# Patient Record
Sex: MALE | Race: BLACK OR AFRICAN AMERICAN | NOT HISPANIC OR LATINO | Employment: FULL TIME | URBAN - METROPOLITAN AREA
[De-identification: names, ages, dates, MRNs, and addresses within clinical notes are randomized per-mention and may not be internally consistent; named-entity substitution may affect disease eponyms.]

---
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
## 2022-09-27 ENCOUNTER — HOSPITAL ENCOUNTER (EMERGENCY)
Facility: HOSPITAL | Age: 22
Discharge: HOME | End: 2022-09-27
Attending: EMERGENCY MEDICINE

## 2022-09-27 VITALS
HEART RATE: 83 BPM | WEIGHT: 164.44 LBS | TEMPERATURE: 98 F | RESPIRATION RATE: 19 BRPM | BODY MASS INDEX: 25.01 KG/M2 | SYSTOLIC BLOOD PRESSURE: 141 MMHG | DIASTOLIC BLOOD PRESSURE: 80 MMHG | OXYGEN SATURATION: 98 %

## 2022-09-27 DIAGNOSIS — K02.9 DENTAL CARIES: Primary | ICD-10-CM

## 2022-09-27 PROCEDURE — 99284 EMERGENCY DEPT VISIT MOD MDM: CPT

## 2022-09-27 RX ORDER — AMOXICILLIN 500 MG/1
500 CAPSULE ORAL 3 TIMES DAILY
Qty: 21 CAPSULE | Refills: 0 | Status: SHIPPED | OUTPATIENT
Start: 2022-09-27 | End: 2022-10-04

## 2022-09-27 RX ORDER — KETOROLAC TROMETHAMINE 10 MG/1
10 TABLET, FILM COATED ORAL EVERY 6 HOURS PRN
Qty: 15 TABLET | Refills: 0 | Status: SHIPPED | OUTPATIENT
Start: 2022-09-27

## 2022-09-27 NOTE — ED PROVIDER NOTES
Encounter Date: 9/27/2022       History     Chief Complaint   Patient presents with    Dental Pain     Since last night, states the pain radiated to his R side head. Denies seeing a dentist.      22-year-old male with complaint of left lower toothache for the past couple days.  Patient reports constant aching pain that is worse with eating and drinking.      Review of patient's allergies indicates:  No Known Allergies  History reviewed. No pertinent past medical history.  History reviewed. No pertinent surgical history.  History reviewed. No pertinent family history.     Review of Systems   Constitutional:  Negative for fever.   HENT:  Positive for dental problem. Negative for sore throat.    Respiratory:  Negative for shortness of breath.    Cardiovascular:  Negative for chest pain.   Gastrointestinal:  Negative for nausea.   Genitourinary:  Negative for dysuria.   Musculoskeletal:  Negative for back pain.   Skin:  Negative for rash.   Neurological:  Negative for weakness.   Hematological:  Does not bruise/bleed easily.     Physical Exam     Initial Vitals [09/27/22 1025]   BP Pulse Resp Temp SpO2   (!) 141/80 83 19 98.4 °F (36.9 °C) 98 %      MAP       --         Physical Exam    Nursing note and vitals reviewed.  Constitutional: He appears well-developed and well-nourished.   HENT:   Head: Normocephalic and atraumatic.   Left bottom molar #2 tenderness and decay, no facial swelling   Eyes: Conjunctivae are normal. Pupils are equal, round, and reactive to light.   Neck: Neck supple.   Normal range of motion.  Cardiovascular:  Normal rate, regular rhythm, normal heart sounds and intact distal pulses.           Pulmonary/Chest: Breath sounds normal.   Abdominal: Abdomen is soft. There is no rebound and no guarding.   Musculoskeletal:         General: Normal range of motion.      Cervical back: Normal range of motion and neck supple.     Neurological: He is alert.   Skin: Skin is warm and dry.   Psychiatric: He has a  normal mood and affect. His behavior is normal. Thought content normal.       ED Course   Procedures  Labs Reviewed - No data to display       Imaging Results    None          Medications - No data to display                           Clinical Impression:   Final diagnoses:  [K02.9] Dental caries (Primary)        ED Disposition Condition    Discharge Stable          ED Prescriptions       Medication Sig Dispense Start Date End Date Auth. Provider    amoxicillin (AMOXIL) 500 MG capsule Take 1 capsule (500 mg total) by mouth 3 (three) times daily. for 7 days 21 capsule 9/27/2022 10/4/2022 Ruben Paiz NP    ketorolac (TORADOL) 10 mg tablet Take 1 tablet (10 mg total) by mouth every 6 (six) hours as needed for Pain. 15 tablet 9/27/2022 -- Ruben Paiz NP          Follow-up Information       Follow up With Specialties Details Why Contact Info    dentist of choice  Schedule an appointment as soon as possible for a visit  As needed              Ruben Paiz NP  09/27/22 1004

## 2022-12-26 ENCOUNTER — HOSPITAL ENCOUNTER (EMERGENCY)
Facility: HOSPITAL | Age: 22
Discharge: HOME | End: 2022-12-26
Attending: EMERGENCY MEDICINE
Payer: MEDICAID

## 2022-12-26 VITALS
TEMPERATURE: 99 F | SYSTOLIC BLOOD PRESSURE: 108 MMHG | HEART RATE: 89 BPM | RESPIRATION RATE: 16 BRPM | OXYGEN SATURATION: 98 % | DIASTOLIC BLOOD PRESSURE: 58 MMHG

## 2022-12-26 DIAGNOSIS — F12.10 MARIJUANA ABUSE: ICD-10-CM

## 2022-12-26 DIAGNOSIS — R11.10 VOMITING, UNSPECIFIED VOMITING TYPE, UNSPECIFIED WHETHER NAUSEA PRESENT: Primary | ICD-10-CM

## 2022-12-26 DIAGNOSIS — E86.9 VOLUME DEPLETION: ICD-10-CM

## 2022-12-26 LAB
ALBUMIN SERPL BCP-MCNC: 4 G/DL (ref 3.5–5.2)
ALP SERPL-CCNC: 54 U/L (ref 55–135)
ALT SERPL W/O P-5'-P-CCNC: 11 U/L (ref 10–44)
AMPHET+METHAMPHET UR QL: NEGATIVE
ANION GAP SERPL CALC-SCNC: 12 MMOL/L (ref 8–16)
AST SERPL-CCNC: 19 U/L (ref 10–40)
BARBITURATES UR QL SCN>200 NG/ML: NEGATIVE
BASOPHILS # BLD AUTO: 0.01 K/UL (ref 0–0.2)
BASOPHILS NFR BLD: 0.1 % (ref 0–1.9)
BENZODIAZ UR QL SCN>200 NG/ML: NEGATIVE
BILIRUB SERPL-MCNC: 0.9 MG/DL (ref 0.1–1)
BUN SERPL-MCNC: 10 MG/DL (ref 6–20)
BZE UR QL SCN: NEGATIVE
CALCIUM SERPL-MCNC: 9.9 MG/DL (ref 8.7–10.5)
CANNABINOIDS UR QL SCN: ABNORMAL
CHLORIDE SERPL-SCNC: 100 MMOL/L (ref 95–110)
CO2 SERPL-SCNC: 25 MMOL/L (ref 23–29)
CREAT SERPL-MCNC: 1.1 MG/DL (ref 0.5–1.4)
CREAT UR-MCNC: 395.7 MG/DL (ref 23–375)
DIFFERENTIAL METHOD: ABNORMAL
EOSINOPHIL # BLD AUTO: 0 K/UL (ref 0–0.5)
EOSINOPHIL NFR BLD: 0.1 % (ref 0–8)
ERYTHROCYTE [DISTWIDTH] IN BLOOD BY AUTOMATED COUNT: 11.9 % (ref 11.5–14.5)
EST. GFR  (NO RACE VARIABLE): >60 ML/MIN/1.73 M^2
GLUCOSE SERPL-MCNC: 142 MG/DL (ref 70–110)
HCT VFR BLD AUTO: 38.8 % (ref 40–54)
HGB BLD-MCNC: 12.8 G/DL (ref 14–18)
IMM GRANULOCYTES # BLD AUTO: 0.02 K/UL (ref 0–0.04)
IMM GRANULOCYTES NFR BLD AUTO: 0.3 % (ref 0–0.5)
INFLUENZA A, MOLECULAR: NEGATIVE
INFLUENZA B, MOLECULAR: NEGATIVE
LIPASE SERPL-CCNC: 8 U/L (ref 4–60)
LYMPHOCYTES # BLD AUTO: 0.3 K/UL (ref 1–4.8)
LYMPHOCYTES NFR BLD: 4.1 % (ref 18–48)
MCH RBC QN AUTO: 26.7 PG (ref 27–31)
MCHC RBC AUTO-ENTMCNC: 33 G/DL (ref 32–36)
MCV RBC AUTO: 81 FL (ref 82–98)
METHADONE UR QL SCN>300 NG/ML: NEGATIVE
MONOCYTES # BLD AUTO: 0.2 K/UL (ref 0.3–1)
MONOCYTES NFR BLD: 3.2 % (ref 4–15)
NEUTROPHILS # BLD AUTO: 6.7 K/UL (ref 1.8–7.7)
NEUTROPHILS NFR BLD: 92.2 % (ref 38–73)
NRBC BLD-RTO: 0 /100 WBC
OPIATES UR QL SCN: NEGATIVE
PCP UR QL SCN>25 NG/ML: NEGATIVE
PLATELET # BLD AUTO: 184 K/UL (ref 150–450)
PMV BLD AUTO: 10.6 FL (ref 9.2–12.9)
POTASSIUM SERPL-SCNC: 4.3 MMOL/L (ref 3.5–5.1)
PROT SERPL-MCNC: 8.7 G/DL (ref 6–8.4)
RBC # BLD AUTO: 4.8 M/UL (ref 4.6–6.2)
SARS-COV-2 RDRP RESP QL NAA+PROBE: NEGATIVE
SODIUM SERPL-SCNC: 137 MMOL/L (ref 136–145)
SPECIMEN SOURCE: NORMAL
TOXICOLOGY INFORMATION: ABNORMAL
WBC # BLD AUTO: 7.29 K/UL (ref 3.9–12.7)

## 2022-12-26 PROCEDURE — 80053 COMPREHEN METABOLIC PANEL: CPT | Performed by: NURSE PRACTITIONER

## 2022-12-26 PROCEDURE — 96375 TX/PRO/DX INJ NEW DRUG ADDON: CPT

## 2022-12-26 PROCEDURE — 25000003 PHARM REV CODE 250: Performed by: NURSE PRACTITIONER

## 2022-12-26 PROCEDURE — 99285 EMERGENCY DEPT VISIT HI MDM: CPT | Mod: 25

## 2022-12-26 PROCEDURE — 83690 ASSAY OF LIPASE: CPT | Performed by: NURSE PRACTITIONER

## 2022-12-26 PROCEDURE — 80307 DRUG TEST PRSMV CHEM ANLYZR: CPT | Performed by: EMERGENCY MEDICINE

## 2022-12-26 PROCEDURE — 63600175 PHARM REV CODE 636 W HCPCS: Performed by: EMERGENCY MEDICINE

## 2022-12-26 PROCEDURE — 63600175 PHARM REV CODE 636 W HCPCS: Performed by: NURSE PRACTITIONER

## 2022-12-26 PROCEDURE — 96361 HYDRATE IV INFUSION ADD-ON: CPT

## 2022-12-26 PROCEDURE — 85025 COMPLETE CBC W/AUTO DIFF WBC: CPT | Performed by: NURSE PRACTITIONER

## 2022-12-26 PROCEDURE — U0002 COVID-19 LAB TEST NON-CDC: HCPCS | Performed by: NURSE PRACTITIONER

## 2022-12-26 PROCEDURE — 87502 INFLUENZA DNA AMP PROBE: CPT | Performed by: NURSE PRACTITIONER

## 2022-12-26 PROCEDURE — 25000003 PHARM REV CODE 250: Performed by: EMERGENCY MEDICINE

## 2022-12-26 PROCEDURE — 96365 THER/PROPH/DIAG IV INF INIT: CPT

## 2022-12-26 RX ORDER — SODIUM CHLORIDE 9 MG/ML
1000 INJECTION, SOLUTION INTRAVENOUS
Status: COMPLETED | OUTPATIENT
Start: 2022-12-26 | End: 2022-12-26

## 2022-12-26 RX ORDER — ACETAMINOPHEN 500 MG
1000 TABLET ORAL
Status: COMPLETED | OUTPATIENT
Start: 2022-12-26 | End: 2022-12-26

## 2022-12-26 RX ORDER — ONDANSETRON 4 MG/1
4 TABLET, ORALLY DISINTEGRATING ORAL EVERY 6 HOURS PRN
Qty: 12 TABLET | Refills: 0 | Status: SHIPPED | OUTPATIENT
Start: 2022-12-26

## 2022-12-26 RX ORDER — ONDANSETRON 2 MG/ML
8 INJECTION INTRAMUSCULAR; INTRAVENOUS
Status: COMPLETED | OUTPATIENT
Start: 2022-12-26 | End: 2022-12-26

## 2022-12-26 RX ADMIN — SODIUM CHLORIDE 1000 ML: 9 INJECTION, SOLUTION INTRAVENOUS at 12:12

## 2022-12-26 RX ADMIN — PROMETHAZINE HYDROCHLORIDE 12.5 MG: 25 INJECTION INTRAMUSCULAR; INTRAVENOUS at 01:12

## 2022-12-26 RX ADMIN — SODIUM CHLORIDE 1000 ML: 9 INJECTION, SOLUTION INTRAVENOUS at 11:12

## 2022-12-26 RX ADMIN — ONDANSETRON 8 MG: 2 INJECTION INTRAMUSCULAR; INTRAVENOUS at 11:12

## 2022-12-26 RX ADMIN — ACETAMINOPHEN 1000 MG: 500 TABLET ORAL at 11:12

## 2022-12-26 NOTE — ED NOTES
Pt drank water without feeling nauseated. Pt reports he is ready to go home. Family member with pt.

## 2022-12-26 NOTE — FIRST PROVIDER EVALUATION
Medical screening examination initiated.  I have conducted a focused provider triage encounter, findings are as follows:    Brief history of present illness:  22-year-old male with complaint of vomiting dizziness since yesterday.  Also reports headache.    There were no vitals filed for this visit.    Pertinent physical exam:  sitting in wheelchair, eyes open

## 2022-12-26 NOTE — ED PROVIDER NOTES
SCRIBE #1 NOTE: I, Martha Ortega, am scribing for, and in the presence of, Gama ePdersen MD. I have scribed the entire note.       History     Chief Complaint   Patient presents with    General Illness     Patient has been having multiple episodes of vomiting for two days. Patient complaining of nausea and dizziness upon arrival.      Review of patient's allergies indicates:  No Known Allergies      History of Present Illness     HPI    12/26/2022, 12:12 PM  History obtained from the patient      History of Present Illness: Eyad Lopez is a 22 y.o. male patient who presents to the Emergency Department for evaluation of vomiting which onset 2 days PTA. Pt has not experienced symptoms to this degree in the past. Pt smokes marijuana and does not have a history of hyperemesis due to marijuana. Symptoms are episodic and moderate in severity. No mitigating or exacerbating factors reported. Associated sxs include nausea and dizziness. Patient denies any fever, chills, diarrhea, abd pain, difficulty urinating, and all other sxs at this time. No further complaints or concerns at this time.       Arrival mode: Personal vehicle      PCP: Primary Doctor No        Past Medical History:  History reviewed. No pertinent past medical history.    Past Surgical History:  History reviewed. No pertinent surgical history.      Family History:  Family History   Problem Relation Age of Onset    No Known Problems Mother     No Known Problems Father        Social History:  Social History     Tobacco Use    Smoking status: Never    Smokeless tobacco: Not on file   Substance and Sexual Activity    Alcohol use: Not Currently    Drug use: Yes     Types: Marijuana    Sexual activity: Not on file        Review of Systems     Review of Systems   Constitutional:  Negative for chills and fever.   HENT:  Negative for sore throat.    Respiratory:  Negative for shortness of breath.    Cardiovascular:  Negative for chest pain.    Gastrointestinal:  Positive for nausea and vomiting. Negative for abdominal pain and diarrhea.   Genitourinary:  Negative for difficulty urinating and dysuria.   Musculoskeletal:  Negative for back pain.   Skin:  Negative for rash.   Neurological:  Positive for dizziness. Negative for weakness.   Hematological:  Does not bruise/bleed easily.   All other systems reviewed and are negative.     Physical Exam     Initial Vitals [12/26/22 0935]   BP Pulse Resp Temp SpO2   138/73 102 16 99.9 °F (37.7 °C) 98 %      MAP       --          Physical Exam  Nursing Notes and Vital Signs Reviewed.  Constitutional: Patient is in no acute distress. Well-developed and well-nourished.  Head: Atraumatic. Normocephalic.  Eyes: PERRL. EOM intact. Conjunctivae are not pale. No scleral icterus. No nystagmus.   ENT: Mucous membranes are moist. Oropharynx is clear and symmetric.    Neck: Supple. Full ROM. No lymphadenopathy.  Cardiovascular: Tachycardic at 104. Regular rhythm. No murmurs, rubs, or gallops. Distal pulses are 2+ and symmetric.  Pulmonary/Chest: No respiratory distress. Clear to auscultation bilaterally. No wheezing or rales.  Abdominal: Soft and non-distended.  There is no tenderness.  No rebound, guarding, or rigidity. Good bowel sounds.  Genitourinary: No CVA tenderness.   Musculoskeletal: Moves all extremities. No obvious deformities. No edema. No calf tenderness.  Skin: Warm and dry.  Neurological:  Alert, awake, and appropriate.  Normal speech.  No acute focal neurological deficits are appreciated.  Psychiatric: Normal affect. Good eye contact. Appropriate in content.     ED Course   Procedures  ED Vital Signs:  Vitals:    12/26/22 0935 12/26/22 1335   BP: 138/73 (!) 108/58   Pulse: 102 89   Resp: 16 16   Temp: 99.9 °F (37.7 °C) 99.3 °F (37.4 °C)   TempSrc: Oral Oral   SpO2: 98% 98%       Abnormal Lab Results:  Labs Reviewed   CBC W/ AUTO DIFFERENTIAL - Abnormal; Notable for the following components:       Result Value     Hemoglobin 12.8 (*)     Hematocrit 38.8 (*)     MCV 81 (*)     MCH 26.7 (*)     Lymph # 0.3 (*)     Mono # 0.2 (*)     Gran % 92.2 (*)     Lymph % 4.1 (*)     Mono % 3.2 (*)     All other components within normal limits   COMPREHENSIVE METABOLIC PANEL - Abnormal; Notable for the following components:    Glucose 142 (*)     Total Protein 8.7 (*)     Alkaline Phosphatase 54 (*)     All other components within normal limits   DRUG SCREEN PANEL, URINE EMERGENCY - Abnormal; Notable for the following components:    THC Presumptive Positive (*)     Creatinine, Urine 395.7 (*)     All other components within normal limits    Narrative:     Specimen Source->Urine   INFLUENZA A & B BY MOLECULAR   LIPASE   SARS-COV-2 RNA AMPLIFICATION, QUAL        All Lab Results:  Results for orders placed or performed during the hospital encounter of 12/26/22   Influenza A & B by Molecular    Specimen: Nasal Swab   Result Value Ref Range    Influenza A, Molecular Negative Negative    Influenza B, Molecular Negative Negative    Flu A & B Source Nasal swab    CBC auto differential   Result Value Ref Range    WBC 7.29 3.90 - 12.70 K/uL    RBC 4.80 4.60 - 6.20 M/uL    Hemoglobin 12.8 (L) 14.0 - 18.0 g/dL    Hematocrit 38.8 (L) 40.0 - 54.0 %    MCV 81 (L) 82 - 98 fL    MCH 26.7 (L) 27.0 - 31.0 pg    MCHC 33.0 32.0 - 36.0 g/dL    RDW 11.9 11.5 - 14.5 %    Platelets 184 150 - 450 K/uL    MPV 10.6 9.2 - 12.9 fL    Immature Granulocytes 0.3 0.0 - 0.5 %    Gran # (ANC) 6.7 1.8 - 7.7 K/uL    Immature Grans (Abs) 0.02 0.00 - 0.04 K/uL    Lymph # 0.3 (L) 1.0 - 4.8 K/uL    Mono # 0.2 (L) 0.3 - 1.0 K/uL    Eos # 0.0 0.0 - 0.5 K/uL    Baso # 0.01 0.00 - 0.20 K/uL    nRBC 0 0 /100 WBC    Gran % 92.2 (H) 38.0 - 73.0 %    Lymph % 4.1 (L) 18.0 - 48.0 %    Mono % 3.2 (L) 4.0 - 15.0 %    Eosinophil % 0.1 0.0 - 8.0 %    Basophil % 0.1 0.0 - 1.9 %    Differential Method Automated    Comprehensive metabolic panel   Result Value Ref Range    Sodium 137 136 - 145  mmol/L    Potassium 4.3 3.5 - 5.1 mmol/L    Chloride 100 95 - 110 mmol/L    CO2 25 23 - 29 mmol/L    Glucose 142 (H) 70 - 110 mg/dL    BUN 10 6 - 20 mg/dL    Creatinine 1.1 0.5 - 1.4 mg/dL    Calcium 9.9 8.7 - 10.5 mg/dL    Total Protein 8.7 (H) 6.0 - 8.4 g/dL    Albumin 4.0 3.5 - 5.2 g/dL    Total Bilirubin 0.9 0.1 - 1.0 mg/dL    Alkaline Phosphatase 54 (L) 55 - 135 U/L    AST 19 10 - 40 U/L    ALT 11 10 - 44 U/L    Anion Gap 12 8 - 16 mmol/L    eGFR >60 >60 mL/min/1.73 m^2   Lipase   Result Value Ref Range    Lipase 8 4 - 60 U/L   COVID-19 Rapid Screening   Result Value Ref Range    SARS-CoV-2 RNA, Amplification, Qual Negative Negative   Drug screen panel, emergency   Result Value Ref Range    Benzodiazepines Negative Negative    Methadone metabolites Negative Negative    Cocaine (Metab.) Negative Negative    Opiate Scrn, Ur Negative Negative    Barbiturate Screen, Ur Negative Negative    Amphetamine Screen, Ur Negative Negative    THC Presumptive Positive (A) Negative    Phencyclidine Negative Negative    Creatinine, Urine 395.7 (H) 23.0 - 375.0 mg/dL    Toxicology Information SEE COMMENT          Imaging Results:  Imaging Results              CT Head Without Contrast (Final result)  Result time 12/26/22 10:54:23      Final result by Alvaro Mena MD (12/26/22 10:54:23)                   Impression:      Intracranially normal head CT.  Right maxillary sinus mucous retention cyst.    All CT scans at this facility are performed  using dose modulation techniques as appropriate to performed exam including the following:  automated exposure control; adjustment of mA and/or kV according to the patients size (this includes techniques or standardized protocols for targeted exams where dose is matched to indication/reason for exam: i.e. extremities or head);  iterative reconstruction technique.      Electronically signed by: Alvaro Mena MD  Date:    12/26/2022  Time:    10:54               Narrative:     EXAMINATION:  CT HEAD WITHOUT CONTRAST    CLINICAL HISTORY:  Headache, sudden, severe;    TECHNIQUE:  Routine noncontrast head CT.    COMPARISON:  None    FINDINGS:  There is no acute intracranial hemorrhage or abnormal extra-axial fluid collection.    There is no abnormal increased or decreased density within the brain parenchyma.  Gray-white differentiation preserved.  Normal ventricles.    There is no intracranial mass or mass effect.    The calvarium is intact.    There is a mucous retention cyst right maxillary sinus, 1.8 cm, incompletely imaged.  Otherwise, the visualized paranasal sinuses mastoid air cells are well aerated.                                             The Emergency Provider reviewed the vital signs and test results, which are outlined above.     ED Discussion     12:33 PM: Pt reports he is tolerating PO, but he is still nauseated.    1:18 PM: Pt is tolerating PO well and eager to be discharged home.  I had a long discussion c the pt/mother including possible viral syndrome, cannabis hyperemesis,etc.  No acute abd - I believe symptomatic tx at this time is appropriate.  Pt and mother are comfortable with that tx plan at this time.  He will return to the ED immediately for any worsening sx/s    1:38 PM: Reassessed pt at this time.  Discussed with pt all pertinent ED information and results. Discussed pt dx and plan of tx. Gave pt all f/u and return to the ED instructions. All questions and concerns were addressed at this time. Pt expresses understanding of information and instructions, and is comfortable with plan to discharge. Pt is stable for discharge.    I discussed with patient and/or family/caretaker that evaluation in the ED does not suggest any emergent or life threatening medical conditions requiring immediate intervention beyond what was provided in the ED, and I believe patient is safe for discharge.  Regardless, an unremarkable evaluation in the ED does not preclude the development  or presence of a serious of life threatening condition. As such, patient was instructed to return immediately for any worsening or change in current symptoms.         Medical Decision Making:   Clinical Tests:   Lab Tests: Ordered and Reviewed  Radiological Study: Ordered and Reviewed         ED Medication(s):  Medications   0.9%  NaCl infusion (0 mLs Intravenous Stopped 12/26/22 1313)   0.9%  NaCl infusion (0 mLs Intravenous Stopped 12/26/22 1313)   ondansetron injection 8 mg (8 mg Intravenous Given 12/26/22 1153)   acetaminophen tablet 1,000 mg (1,000 mg Oral Given 12/26/22 1147)   promethazine (PHENERGAN) 12.5 mg in dextrose 5 % 50 mL IVPB (0 mg Intravenous Stopped 12/26/22 1334)       Discharge Medication List as of 12/26/2022  1:16 PM        START taking these medications    Details   ondansetron (ZOFRAN-ODT) 4 MG TbDL Take 1 tablet (4 mg total) by mouth every 6 (six) hours as needed (nausea)., Starting Mon 12/26/2022, Print              Follow-up Information       Your primary care physician. Schedule an appointment as soon as possible for a visit in 3 days.               Schedule an appointment as soon as possible for a visit  with [facility].    Why: As needed  Contact information:  [address] [address] [phone]                                 Scribe Attestation:   Scribe #1: I performed the above scribed service and the documentation accurately describes the services I performed. I attest to the accuracy of the note.     Attending:   Physician Attestation Statement for Scribe #1: I, Gama Pedersen MD, personally performed the services described in this documentation, as scribed by Martha Ortega, in my presence, and it is both accurate and complete.           Clinical Impression       ICD-10-CM ICD-9-CM   1. Vomiting, unspecified vomiting type, unspecified whether nausea present  R11.10 787.03   2. Marijuana abuse  F12.10 305.20   3. Volume depletion  E86.9 276.50        Disposition:   Disposition: Discharged  Condition: Stable       Gama Pedersen MD  12/26/22 3140

## 2023-05-30 ENCOUNTER — HOSPITAL ENCOUNTER (EMERGENCY)
Facility: HOSPITAL | Age: 23
Discharge: HOME | End: 2023-05-30
Attending: EMERGENCY MEDICINE
Payer: MEDICAID

## 2023-05-30 VITALS
WEIGHT: 162.06 LBS | BODY MASS INDEX: 25.44 KG/M2 | RESPIRATION RATE: 16 BRPM | OXYGEN SATURATION: 99 % | HEART RATE: 96 BPM | DIASTOLIC BLOOD PRESSURE: 78 MMHG | SYSTOLIC BLOOD PRESSURE: 132 MMHG | TEMPERATURE: 99 F | HEIGHT: 67 IN

## 2023-05-30 DIAGNOSIS — J36 PERITONSILLAR ABSCESS: Primary | ICD-10-CM

## 2023-05-30 LAB
ALBUMIN SERPL BCP-MCNC: 4.4 G/DL (ref 3.5–5.2)
ALP SERPL-CCNC: 62 U/L (ref 55–135)
ALT SERPL W/O P-5'-P-CCNC: 9 U/L (ref 10–44)
ANION GAP SERPL CALC-SCNC: 12 MMOL/L (ref 8–16)
AST SERPL-CCNC: 13 U/L (ref 10–40)
BASOPHILS # BLD AUTO: 0.04 K/UL (ref 0–0.2)
BASOPHILS NFR BLD: 0.2 % (ref 0–1.9)
BILIRUB SERPL-MCNC: 0.9 MG/DL (ref 0.1–1)
BUN SERPL-MCNC: 11 MG/DL (ref 6–20)
CALCIUM SERPL-MCNC: 9.9 MG/DL (ref 8.7–10.5)
CHLORIDE SERPL-SCNC: 102 MMOL/L (ref 95–110)
CO2 SERPL-SCNC: 25 MMOL/L (ref 23–29)
CREAT SERPL-MCNC: 1 MG/DL (ref 0.5–1.4)
DIFFERENTIAL METHOD: ABNORMAL
EOSINOPHIL # BLD AUTO: 0.1 K/UL (ref 0–0.5)
EOSINOPHIL NFR BLD: 0.3 % (ref 0–8)
ERYTHROCYTE [DISTWIDTH] IN BLOOD BY AUTOMATED COUNT: 12.5 % (ref 11.5–14.5)
EST. GFR  (NO RACE VARIABLE): >60 ML/MIN/1.73 M^2
GLUCOSE SERPL-MCNC: 103 MG/DL (ref 70–110)
HCT VFR BLD AUTO: 42.7 % (ref 40–54)
HCV AB SERPL QL IA: NEGATIVE
HEP C VIRUS HOLD SPECIMEN: NORMAL
HGB BLD-MCNC: 13.9 G/DL (ref 14–18)
HIV 1+2 AB+HIV1 P24 AG SERPL QL IA: NEGATIVE
IMM GRANULOCYTES # BLD AUTO: 0.05 K/UL (ref 0–0.04)
IMM GRANULOCYTES NFR BLD AUTO: 0.3 % (ref 0–0.5)
LYMPHOCYTES # BLD AUTO: 2 K/UL (ref 1–4.8)
LYMPHOCYTES NFR BLD: 11.5 % (ref 18–48)
MCH RBC QN AUTO: 27.7 PG (ref 27–31)
MCHC RBC AUTO-ENTMCNC: 32.6 G/DL (ref 32–36)
MCV RBC AUTO: 85 FL (ref 82–98)
MONOCYTES # BLD AUTO: 1.2 K/UL (ref 0.3–1)
MONOCYTES NFR BLD: 7.1 % (ref 4–15)
NEUTROPHILS # BLD AUTO: 13.9 K/UL (ref 1.8–7.7)
NEUTROPHILS NFR BLD: 80.6 % (ref 38–73)
NRBC BLD-RTO: 0 /100 WBC
PLATELET # BLD AUTO: 324 K/UL (ref 150–450)
PMV BLD AUTO: 10.1 FL (ref 9.2–12.9)
POTASSIUM SERPL-SCNC: 3.8 MMOL/L (ref 3.5–5.1)
PROT SERPL-MCNC: 8.6 G/DL (ref 6–8.4)
RBC # BLD AUTO: 5.01 M/UL (ref 4.6–6.2)
SODIUM SERPL-SCNC: 139 MMOL/L (ref 136–145)
WBC # BLD AUTO: 17.2 K/UL (ref 3.9–12.7)

## 2023-05-30 PROCEDURE — 80053 COMPREHEN METABOLIC PANEL: CPT | Performed by: REGISTERED NURSE

## 2023-05-30 PROCEDURE — 63600175 PHARM REV CODE 636 W HCPCS: Performed by: REGISTERED NURSE

## 2023-05-30 PROCEDURE — 99284 PR EMERGENCY DEPT VISIT,LEVEL IV: ICD-10-PCS | Mod: 25,,, | Performed by: STUDENT IN AN ORGANIZED HEALTH CARE EDUCATION/TRAINING PROGRAM

## 2023-05-30 PROCEDURE — 42700 I&D ABSCESS PERITONSILLAR: CPT | Mod: ,,, | Performed by: STUDENT IN AN ORGANIZED HEALTH CARE EDUCATION/TRAINING PROGRAM

## 2023-05-30 PROCEDURE — 87389 HIV-1 AG W/HIV-1&-2 AB AG IA: CPT | Performed by: EMERGENCY MEDICINE

## 2023-05-30 PROCEDURE — 96374 THER/PROPH/DIAG INJ IV PUSH: CPT

## 2023-05-30 PROCEDURE — 42700 PR INC/DRAIN PERITONSIL ABSCESS: ICD-10-PCS | Mod: ,,, | Performed by: STUDENT IN AN ORGANIZED HEALTH CARE EDUCATION/TRAINING PROGRAM

## 2023-05-30 PROCEDURE — 25500020 PHARM REV CODE 255: Performed by: REGISTERED NURSE

## 2023-05-30 PROCEDURE — 86803 HEPATITIS C AB TEST: CPT | Performed by: EMERGENCY MEDICINE

## 2023-05-30 PROCEDURE — 87070 CULTURE OTHR SPECIMN AEROBIC: CPT | Performed by: STUDENT IN AN ORGANIZED HEALTH CARE EDUCATION/TRAINING PROGRAM

## 2023-05-30 PROCEDURE — 25000003 PHARM REV CODE 250: Performed by: REGISTERED NURSE

## 2023-05-30 PROCEDURE — 85025 COMPLETE CBC W/AUTO DIFF WBC: CPT | Performed by: REGISTERED NURSE

## 2023-05-30 PROCEDURE — 99284 EMERGENCY DEPT VISIT MOD MDM: CPT | Mod: 25,,, | Performed by: STUDENT IN AN ORGANIZED HEALTH CARE EDUCATION/TRAINING PROGRAM

## 2023-05-30 PROCEDURE — 99285 EMERGENCY DEPT VISIT HI MDM: CPT | Mod: 25

## 2023-05-30 RX ORDER — CLINDAMYCIN HYDROCHLORIDE 150 MG/1
600 CAPSULE ORAL
Status: COMPLETED | OUTPATIENT
Start: 2023-05-30 | End: 2023-05-30

## 2023-05-30 RX ORDER — DEXAMETHASONE SODIUM PHOSPHATE 4 MG/ML
8 INJECTION, SOLUTION INTRA-ARTICULAR; INTRALESIONAL; INTRAMUSCULAR; INTRAVENOUS; SOFT TISSUE
Status: COMPLETED | OUTPATIENT
Start: 2023-05-30 | End: 2023-05-30

## 2023-05-30 RX ORDER — LIDOCAINE HYDROCHLORIDE AND EPINEPHRINE 10; 10 MG/ML; UG/ML
10 INJECTION, SOLUTION INFILTRATION; PERINEURAL ONCE
Status: COMPLETED | OUTPATIENT
Start: 2023-05-30 | End: 2023-05-30

## 2023-05-30 RX ORDER — CLINDAMYCIN HYDROCHLORIDE 300 MG/1
300 CAPSULE ORAL EVERY 6 HOURS
Qty: 40 CAPSULE | Refills: 0 | Status: SHIPPED | OUTPATIENT
Start: 2023-05-30 | End: 2023-06-09

## 2023-05-30 RX ADMIN — CLINDAMYCIN HYDROCHLORIDE 600 MG: 150 CAPSULE ORAL at 12:05

## 2023-05-30 RX ADMIN — LIDOCAINE HYDROCHLORIDE,EPINEPHRINE BITARTRATE 10 ML: 10; .01 INJECTION, SOLUTION INFILTRATION; PERINEURAL at 03:05

## 2023-05-30 RX ADMIN — IOHEXOL 100 ML: 350 INJECTION, SOLUTION INTRAVENOUS at 12:05

## 2023-05-30 RX ADMIN — DEXAMETHASONE SODIUM PHOSPHATE 8 MG: 4 INJECTION INTRA-ARTICULAR; INTRALESIONAL; INTRAMUSCULAR; INTRAVENOUS; SOFT TISSUE at 12:05

## 2023-05-30 NOTE — ED PROVIDER NOTES
Encounter Date: 5/30/2023    SCRIBE #1 NOTE: I, Lily Maurice, am scribing for, and in the presence of,  Jaime Ca MD. I have scribed the following portions of the note - Other sections scribed: ED Course and ED Discussion.     History     Chief Complaint   Patient presents with    Sore Throat     Pt reports pain to L. Side of throat with difficulty swallowing x 1 week. Pt says he was taking his mothers antibiotics at home, but it hasn't helped.      22-year-old male presents emergency department with complaints of sore throat and difficulty swallowing that began approximately 1 week ago.  Patient reports taking amoxicillin that his mom had left over at home.  Patient denies any relief with medication.  Patient denies any hoarseness, shortness of breath, chest pain, fever, chills or any other symptoms at this time.    The history is provided by the patient.   Review of patient's allergies indicates:  No Known Allergies  No past medical history on file.  No past surgical history on file.  Family History   Problem Relation Age of Onset    No Known Problems Mother     No Known Problems Father      Social History     Tobacco Use    Smoking status: Never   Substance Use Topics    Alcohol use: Not Currently    Drug use: Yes     Types: Marijuana     Review of Systems   Constitutional:  Negative for fever.   HENT:  Positive for sore throat and trouble swallowing.    Respiratory:  Negative for shortness of breath.    Cardiovascular:  Negative for chest pain.   Gastrointestinal:  Negative for nausea.   Genitourinary:  Negative for dysuria.   Musculoskeletal:  Negative for back pain.   Skin:  Negative for rash.   Neurological:  Negative for weakness.   Hematological:  Does not bruise/bleed easily.   All other systems reviewed and are negative.    Physical Exam     Initial Vitals [05/30/23 1048]   BP Pulse Resp Temp SpO2   (!) 140/79 98 20 98.8 °F (37.1 °C) 98 %      MAP       --         Physical Exam    Constitutional:  He appears well-developed and well-nourished. No distress.   HENT:   Head: Normocephalic and atraumatic.   Nose: Nose normal.   Mouth/Throat: Uvula is midline. Posterior oropharyngeal edema and posterior oropharyngeal erythema present.   Large left tonsil with uvula deviation towards the right tonsil   Eyes: Conjunctivae and EOM are normal. Pupils are equal, round, and reactive to light.   Neck: Neck supple.   Normal range of motion.  Cardiovascular:  Normal rate and regular rhythm.           Pulmonary/Chest: Effort normal and breath sounds normal. No respiratory distress. He has no decreased breath sounds. He has no wheezes. He has no rales.   Abdominal: Abdomen is soft. Bowel sounds are normal. There is no abdominal tenderness.   Musculoskeletal:         General: Normal range of motion.      Cervical back: Normal range of motion and neck supple.     Neurological: He is alert and oriented to person, place, and time. He has normal strength. GCS eye subscore is 4. GCS verbal subscore is 5. GCS motor subscore is 6.   Skin: Skin is warm and dry. Capillary refill takes less than 2 seconds. No rash noted.   Psychiatric: He has a normal mood and affect. His speech is normal and behavior is normal.       ED Course   Procedures  Labs Reviewed   CBC W/ AUTO DIFFERENTIAL - Abnormal; Notable for the following components:       Result Value    WBC 17.20 (*)     Hemoglobin 13.9 (*)     Gran # (ANC) 13.9 (*)     Immature Grans (Abs) 0.05 (*)     Mono # 1.2 (*)     Gran % 80.6 (*)     Lymph % 11.5 (*)     All other components within normal limits    Narrative:     Release to patient->Immediate   COMPREHENSIVE METABOLIC PANEL - Abnormal; Notable for the following components:    Total Protein 8.6 (*)     ALT 9 (*)     All other components within normal limits    Narrative:     Release to patient->Immediate   HIV 1 / 2 ANTIBODY    Narrative:     Release to patient->Immediate   HEPATITIS C ANTIBODY    Narrative:     Release to  patient->Immediate   HEP C VIRUS HOLD SPECIMEN    Narrative:     Release to patient->Immediate     All Lab Results:  Results for orders placed or performed during the hospital encounter of 05/30/23   CBC auto differential   Result Value Ref Range    WBC 17.20 (H) 3.90 - 12.70 K/uL    RBC 5.01 4.60 - 6.20 M/uL    Hemoglobin 13.9 (L) 14.0 - 18.0 g/dL    Hematocrit 42.7 40.0 - 54.0 %    MCV 85 82 - 98 fL    MCH 27.7 27.0 - 31.0 pg    MCHC 32.6 32.0 - 36.0 g/dL    RDW 12.5 11.5 - 14.5 %    Platelets 324 150 - 450 K/uL    MPV 10.1 9.2 - 12.9 fL    Immature Granulocytes 0.3 0.0 - 0.5 %    Gran # (ANC) 13.9 (H) 1.8 - 7.7 K/uL    Immature Grans (Abs) 0.05 (H) 0.00 - 0.04 K/uL    Lymph # 2.0 1.0 - 4.8 K/uL    Mono # 1.2 (H) 0.3 - 1.0 K/uL    Eos # 0.1 0.0 - 0.5 K/uL    Baso # 0.04 0.00 - 0.20 K/uL    nRBC 0 0 /100 WBC    Gran % 80.6 (H) 38.0 - 73.0 %    Lymph % 11.5 (L) 18.0 - 48.0 %    Mono % 7.1 4.0 - 15.0 %    Eosinophil % 0.3 0.0 - 8.0 %    Basophil % 0.2 0.0 - 1.9 %    Differential Method Automated    Comprehensive metabolic panel   Result Value Ref Range    Sodium 139 136 - 145 mmol/L    Potassium 3.8 3.5 - 5.1 mmol/L    Chloride 102 95 - 110 mmol/L    CO2 25 23 - 29 mmol/L    Glucose 103 70 - 110 mg/dL    BUN 11 6 - 20 mg/dL    Creatinine 1.0 0.5 - 1.4 mg/dL    Calcium 9.9 8.7 - 10.5 mg/dL    Total Protein 8.6 (H) 6.0 - 8.4 g/dL    Albumin 4.4 3.5 - 5.2 g/dL    Total Bilirubin 0.9 0.1 - 1.0 mg/dL    Alkaline Phosphatase 62 55 - 135 U/L    AST 13 10 - 40 U/L    ALT 9 (L) 10 - 44 U/L    Anion Gap 12 8 - 16 mmol/L    eGFR >60 >60 mL/min/1.73 m^2   HIV 1/2 Ag/Ab (4th Gen)   Result Value Ref Range    HIV 1/2 Ag/Ab Negative Negative   Hepatitis C Antibody   Result Value Ref Range    Hepatitis C Ab Negative Negative   HCV Virus Hold Specimen   Result Value Ref Range    HEP C Virus Hold Specimen Hold for HCV sendout             Imaging Results              CT Soft Tissue Neck With Contrast (Final result)  Result time 05/30/23  13:48:38      Final result by Jaylon Momin Jr., MD (05/30/23 13:48:38)                   Impression:      1. Left-sided peritonsillar abscess measuring 24 x 14 x 21 mm.  2. Findings of severe pharyngitis with swelling of the tissues of the nasopharynx and oropharynx.  Mild edema within the left wall of the superior hypopharynx.  No signs of prevertebral abscess.  All CT scans at this facility use dose modulation, iterative reconstruction, and/or weight base dosing when appropriate to reduce radiation dose to as low as reasonably achievable.      Electronically signed by: Jaylon Momin Jr., MD  Date:    05/30/2023  Time:    13:48               Narrative:    EXAMINATION:  CT SOFT TISSUE NECK WITH CONTRAST    CLINICAL HISTORY:  Neck abscess, deep tissue;    TECHNIQUE:  Contiguous axial images were obtained from the aortic arch through the vertex with iodinated intravenous contrast in venous phase of imaging.  100 mL Omnipaque 350 was injected intravenously.  Coronal and sagittal reconstructions were obtained.    COMPARISON:  None.    FINDINGS:  There is swelling of the adenoidal/nasopharyngeal tissue and palatine tonsillar tissue bilaterally.  Left-sided para tonsillar abscess measures 24 mm craniocaudal by 14 mm transverse by 21 mm AP.  Mild narrowing of the airway at the level of the superior oropharynx.  Mild swelling of the left wall of the hypopharynx.  The valleculae and piriform sinuses are well aerated with normal appearing larynx and subglottic airway.  Trace retropharyngeal edema without peripheral enhancement at the levels of C1 and C2.  Enlarged nodes within the left neck that are reactive in nature including the left jugulodigastric node measuring 20 x 13 mm.  Left level 2 B lymph nodes measure up to 9 mm short axis.    The parotid and submandibular glands are normal. The thyroid gland is normal in CT appearance.    The carotid arteries and jugular veins are patent.    The cervical spine is normal in  appearance. Imaged intracranial structures are unremarkable. Retention cyst within the right maxillary sinus.  Visualized lung apices and mediastinum are normal.                                    ED Discussion     2:16 PM:  Consulted ENT Dr. Segura who plans to see patient in the emergency department and perform I&D    4:53 PM: Rasheed Salazar Jr., SANDY transfers care of patient to Dr. Ca.     4:54 PM: Dr. Segura (ENT) has drained the pt's peritonsillar abscess at bedside. Dr. Segura recommends switching the pt's abx to Clindamycin and having the pt f/u outpatient with him.    4:57 PM: Reassessed pt at this time.   Discussed with pt all pertinent ED information and results. Discussed pt dx and plan of tx. Gave pt all f/u and return to the ED instructions. All questions and concerns were addressed at this time. Pt expresses understanding of information and instructions, and is comfortable with plan to discharge. Pt is stable for discharge.    I discussed with patient and/or family/caretaker that evaluation in the ED does not suggest any emergent or life threatening medical conditions requiring immediate intervention beyond what was provided in the ED, and I believe patient is safe for discharge.  Regardless, an unremarkable evaluation in the ED does not preclude the development or presence of a serious of life threatening condition. As such, patient was instructed to return immediately for any worsening or change in current symptoms.       Medications   dexAMETHasone injection 8 mg (8 mg Intravenous Given 5/30/23 1211)   clindamycin capsule 600 mg (600 mg Oral Given 5/30/23 1214)   iohexoL (OMNIPAQUE 350) injection 100 mL (100 mLs Intravenous Given 5/30/23 1259)   LIDOcaine-EPINEPHrine 1%-1:100,000 injection 10 mL (10 mLs Intradermal Given by Provider 5/30/23 1515)     Medical Decision Making:   Clinical Tests:   Lab Tests: Ordered and Reviewed  Radiological Study: Ordered and Reviewed  ED  Management:  Peritonsillar abscess.  Drained at bedside by ENT.  ENT recommended switching to clindamycin.  Clindamycin prescription provided outpatient ENT follow-up information provided.  ER return precautions provided              Scribe Attestation:   Scribe #1: I performed the above scribed service and the documentation accurately describes the services I performed. I attest to the accuracy of the note.    Attending Attestation:           Physician Attestation for Scribe:  Physician Attestation Statement for Scribe #1: I, Jaime Ca MD, reviewed documentation, as scribed by Lily Maurice in my presence, and it is both accurate and complete.                        Clinical Impression:   Final diagnoses:  [J36] Peritonsillar abscess (Primary)        ED Disposition Condition    Discharge Stable          ED Prescriptions       Medication Sig Dispense Start Date End Date Auth. Provider    clindamycin (CLEOCIN) 300 MG capsule Take 1 capsule (300 mg total) by mouth every 6 (six) hours. for 10 days 40 capsule 5/30/2023 6/9/2023 Jaime Ca MD          Follow-up Information       Follow up With Specialties Details Why Contact Info    Nader Segura MD Otolaryngology Schedule an appointment as soon as possible for a visit   [address]  [place] [address]  [phone]      [facility] - Emergency Dept. Emergency Medicine  As needed, If symptoms worsen [address] [facility] [address] [phone]             Jaime Ca MD  05/30/23 1701

## 2023-05-30 NOTE — CONSULTS
Otolaryngology Consultation Note       Referring Physician: Dr. Jaime Ca MD     Patient Location at Initial Consult: Emergency Department     Chief Complaint/Reason for Consult: peritonsillar abscess           History of Presenting Illness:      Eyad Lopez is a  22 y.o. male presenting with sore throat for 8 days. Additional symptoms include odynophagia, mild dysarthria. Denies trouble breathing or lying flat or trismus. Has been treated with amoxil with modest improvement. Throat pain rated as moderate, persistent. No exacerbating or relieving factors. No similar prior episodes.               No past medical history on file.          No past surgical history on file.       Family History   Problem Relation Age of Onset    No Known Problems Mother     No Known Problems Father              Social History     Socioeconomic History    Marital status: Single   Tobacco Use    Smoking status: Never   Substance and Sexual Activity    Alcohol use: Not Currently    Drug use: Yes     Types: Marijuana             No current facility-administered medications on file prior to encounter.     Current Outpatient Medications on File Prior to Encounter   Medication Sig Dispense Refill    diclofenac (VOLTAREN) 50 MG EC tablet Take 1 tablet (50 mg total) by mouth 3 (three) times daily as needed. 15 tablet 0    ketorolac (TORADOL) 10 mg tablet Take 1 tablet (10 mg total) by mouth every 6 (six) hours as needed for Pain. 15 tablet 0    ondansetron (ZOFRAN-ODT) 4 MG TbDL Take 1 tablet (4 mg total) by mouth every 6 (six) hours as needed (nausea). 12 tablet 0             Review of patient's allergies indicates:  No Known Allergies           Review of Systems:     Constitutional:  Negative for fever.   HENT:  Positive for sore throat and trouble swallowing.    Respiratory:  Negative for shortness of breath.    Cardiovascular:  Negative for chest pain.   Gastrointestinal:  Negative for nausea.   Genitourinary:  Negative for  dysuria.   Musculoskeletal:  Negative for back pain.   Skin:  Negative for rash.   Neurological:  Negative for weakness.   Hematological:  Does not bruise/bleed easily.   All other systems reviewed and are negative.    OBJECTIVE:     Vital Signs:     Vitals:    05/30/23 1508   BP: 137/80   Pulse: 99   Resp: 16   Temp:              I&O     No intake or output data in the 24 hours ending 05/30/23 1655          Physical Exam     General:  No acute distress. Voice strong, mild dysarthria      Head/Face: Normocephalic, atraumatic. No scars or lesions. Facial sensation intact bilaterally in distribution of V1-V3. Facial nerve intact and equal bilaterally.  No facial lacerations.      Eyes: PERRL, EOMI     Ears: No gross deformity. Normal external canal.      Hearing: Normal speech reception.     Nose: No gross deformity or lesions. No purulent discharge.      Mouth/Oropharynx: Lips without any lesions. Dentition good. No mucosal lesions within the oropharynx.  Tonsillar inflammation, left palatal swelling and fluctuance with rigthward uvular deviation    Neck: Trachea midline. No masses. No thyromegaly or nodules palpated. No crepitus.     Lymphatic: No lymphadenopathy in the neck.     Respiratory: No stridor or distress.     Cardiovascular: Regular rate and rhythm.     Extremities: No edema or cyanosis. Warm and well-perfused.     Skin: No scars or lesions on face or neck.     Neurologic: Moving all extremities without gross abnormality.       Review of Ancillary Data / Diagnostic Tests:       Labs:     Lab Results   Component Value Date    WBC 17.20 (H) 05/30/2023    HGB 13.9 (L) 05/30/2023    HCT 42.7 05/30/2023     05/30/2023    ALT 9 (L) 05/30/2023    AST 13 05/30/2023     05/30/2023    K 3.8 05/30/2023     05/30/2023    CREATININE 1.0 05/30/2023    BUN 11 05/30/2023    CO2 25 05/30/2023        Images    I have independently reviewed the following imaging:    CT maxillofacial    There is swelling  of the adenoidal/nasopharyngeal tissue and palatine tonsillar tissue bilaterally.  Left-sided para tonsillar abscess measures 24 mm craniocaudal by 14 mm transverse by 21 mm AP.  Mild narrowing of the airway at the level of the superior oropharynx.  Mild swelling of the left wall of the hypopharynx.  The valleculae and piriform sinuses are well aerated with normal appearing larynx and subglottic airway.  Trace retropharyngeal edema without peripheral enhancement at the levels of C1 and C2.  Enlarged nodes within the left neck that are reactive in nature including the left jugulodigastric node measuring 20 x 13 mm.  Left level 2 B lymph nodes measure up to 9 mm short axis.     The parotid and submandibular glands are normal. The thyroid gland is normal in CT appearance.     The carotid arteries and jugular veins are patent.     The cervical spine is normal in appearance. Imaged intracranial structures are unremarkable. Retention cyst within the right maxillary sinus.  Visualized lung apices and mediastinum are normal.     Impression:     1. Left-sided peritonsillar abscess measuring 24 x 14 x 21 mm.  2. Findings of severe pharyngitis with swelling of the tissues of the nasopharynx and oropharynx.  Mild edema within the left wall of the superior hypopharynx.  No signs of prevertebral abscess.  All CT scans at this facility use dose modulation, iterative reconstruction, and/or weight base dosing when appropriate to reduce radiation dose to as low as reasonably achievable.        ASSESSMENT:     22 y.o. male with L PTA s/p bedside I&D       RECOMMENDATIONS:       - clindamycin  - f/u culture  - f/u ENT in 1 week  - Return to ED sooner if symptoms worsen        Electronically signed by: Nader Segura

## 2023-05-30 NOTE — PROCEDURES
Procedure Note     DATE OF PROCEDURE: 05/30/2023      PRE-OPERATIVE DIAGNOSIS:   left peritonsillar abscess     POST-OPERATIVE DIAGNOSIS:   Same       PROCEDURE:   Incision and drainage of left peritonsillar abscess       SURGEON:   Nader Segura MD     ANESTHESIA:   local     ESTIMATED BLOOD LOSS:   minimal      SPECIMENS:   left tonsil for culture     INDICATIONS:    Eyad Lopez is a 22 y.o. male with left peritonsillar abscess who presents for incision and drainage. Risks, benefits, and expectations were thoroughly discussed and the patient/patient's family wished to proceed. Informed consent was obtained. All questions were answered.          DETAILS OF PROCEDURE: The patient was verbally consented to undergo this procedure. The risks/benefits and alternatives were explained. The patient had previously been administered an appropriate dose of Decadron. Following this, Lidocaine 1% with 1:100k epinephrine was infiltrated in the area of fluctuance. An 11-blade scalpel was used to make a vertical incision in the same location. Curved hemostats were used to open any and all pockets of abscess. Copious purulent fluid was identified. This was cultured using a culture swab. There was no significant bleeding. The pocket was flushed with sterile saline until it returned clear. All instrumentation was removed. The patient tolerated the procedure well.

## 2023-05-30 NOTE — FIRST PROVIDER EVALUATION
Medical screening examination initiated.  I have conducted a focused provider triage encounter, findings are as follows:    Brief history of present illness:  Sore throat, left tonsil swelling, difficulty swallowing x1 week    Vitals:    05/30/23 1048   BP: (!) 140/79   BP Location: Right arm   Patient Position: Sitting   Pulse: 98   Resp: 20   Temp: 98.8 °F (37.1 °C)   TempSrc: Oral   SpO2: 98%   Weight: 73.5 kg (162 lb 0.6 oz)       Pertinent physical exam:  No acute distress, vital signs stable, patient alert and oriented    Brief workup plan:  Workup and further evaluation    Preliminary workup initiated; this workup will be continued and followed by the physician or advanced practice provider that is assigned to the patient when roomed.

## 2023-06-02 LAB — BACTERIA SPEC AEROBE CULT: NORMAL
